# Patient Record
Sex: FEMALE | NOT HISPANIC OR LATINO | URBAN - METROPOLITAN AREA
[De-identification: names, ages, dates, MRNs, and addresses within clinical notes are randomized per-mention and may not be internally consistent; named-entity substitution may affect disease eponyms.]

---

## 2017-03-29 NOTE — PATIENT DISCUSSION
Posterior Capsular Fibrosis Counseling: The diagnosis of posterior capsular fibrosis (PCF), also referred to as a secondary cataract or posterior capsular opacification (PCO), was discussed with the patient. The patient understands that their symptoms and limitations are likely related to this condition. I have reviewed the risks, benefits and alternatives of  YAG laser surgery for the treatment of the fibrosis. The uncommon risk of an increase in intraocular pressure or a retinal detachment and their associated symptoms were explained to the patient. The patient understands and desires to proceed with the laser surgery to improve their vision for __READ SMALL PRINT_________.

## 2017-03-29 NOTE — PATIENT DISCUSSION
New Prescription: FML Liquifilm (fluorometholone): drops,suspension: 0.1% 1 drop twice a day into both eyes 03-

## 2017-03-29 NOTE — PATIENT DISCUSSION
AMD (DRY), OD__:  PRESCRIBE AREDS 2 VITAMINS / AMSLER GRID QD/ UV PROTECTION. SMOKING CESSATION EMPHASIZED. RETURN FOR FOLLOW-UP AS SCHEDULED.

## 2017-03-29 NOTE — PATIENT DISCUSSION
DR. Janessa Germain [facility].  HE IS TO USE 2 TIMES A DAY FOR 2 WEEKS IN THE RIGHT EYE ONLY FOR PAIN

## 2017-03-29 NOTE — PATIENT DISCUSSION
Continue: erythromycin (erythromycin): ointment: 5 mg/gram (0.5 %) a small amount at bedtime into both eyes

## 2017-03-29 NOTE — PATIENT DISCUSSION
BLEPHARITIS, OU: PRESCRIBE WARM COMPRESSES AND EYELID SCRUBS QD-BID, ARTIFICIAL TEARS BID-QID, THE DAILY INTAKE OF OMEGA-3 FATTY ACIDS AND ANTIBIOTIC OINTMENT. RETURN FOR FOLLOW-UP AS SCHEDULED.

## 2017-08-01 ENCOUNTER — IMPORTED ENCOUNTER (OUTPATIENT)
Dept: URBAN - METROPOLITAN AREA CLINIC 1 | Facility: CLINIC | Age: 82
End: 2017-08-01

## 2017-08-01 PROBLEM — H16.143: Noted: 2017-08-01

## 2017-08-01 PROBLEM — H25.813: Noted: 2017-08-01

## 2017-08-01 PROBLEM — H43.813: Noted: 2017-08-01

## 2017-08-01 PROBLEM — H04.123: Noted: 2017-08-01

## 2017-08-01 PROBLEM — H35.033: Noted: 2017-08-01

## 2017-08-01 PROCEDURE — 92004 COMPRE OPH EXAM NEW PT 1/>: CPT

## 2017-08-01 PROCEDURE — 92015 DETERMINE REFRACTIVE STATE: CPT

## 2017-08-01 NOTE — PATIENT DISCUSSION
1.  Cataract OU:  Visually Significant secondary to glare discussed the risks benefits alternatives and limitations of cataract surgery. The patient stated a full understanding and a desire to proceed with the procedure. The patient will need to return for preop appointment with cataract measurements and to have any additional questions answered and start pre-operative eye drops as directed. Phaco PCL OS then OD. Otherwise follow-up 6 months for DFE glare2. PAOLO w/ PEK OU - Increase ATs to TID OU.3.  GR I Hypertensive Retinopathy OU - Stable continue HTN Control. 4. PVD w/o Tear OU - Patient was cautioned to call our office immediately if they experience   a substantial change in their symptoms such as an increase in floaters persistent flashes loss of visual field (may appear as a shadow or a curtain) or decrease in visual acuity as these may indicate a retinal tear or detachment. Return for an appointment in [facility]/ with Dr. Debi Sorto.

## 2017-09-11 ENCOUNTER — IMPORTED ENCOUNTER (OUTPATIENT)
Dept: URBAN - METROPOLITAN AREA CLINIC 1 | Facility: CLINIC | Age: 82
End: 2017-09-11

## 2017-09-11 PROBLEM — H25.813: Noted: 2017-09-11

## 2017-09-11 PROCEDURE — 92136 OPHTHALMIC BIOMETRY: CPT

## 2017-09-11 NOTE — PATIENT DISCUSSION
1. Cataract OU:  Visually Significant secondary to glare discussed the risks benefits alternatives and limitations of cataract surgery. The patient stated a full understanding and a desire to proceed with the procedure. Instructed/ discussed with patient if gtts are expensive (over 150 dollars) to call our office so we can recommend alternatives. Pt understood. Confirmed PO Meds were sent to Pharmacy and Pt understands they will need glasses post-op to achieve their best corrected vision. Phaco PCL OS then OD.

## 2017-09-20 ENCOUNTER — IMPORTED ENCOUNTER (OUTPATIENT)
Dept: URBAN - METROPOLITAN AREA CLINIC 1 | Facility: CLINIC | Age: 82
End: 2017-09-20

## 2017-09-20 NOTE — PATIENT DISCUSSION
Posterior Capsular Fibrosis Counseling: The diagnosis of posterior capsular fibrosis (PCF), also referred to as a secondary cataract or posterior capsular opacification (PCO), was discussed with the patient. The patient understands that their symptoms and limitations are likely related to this condition. I have reviewed the risks, benefits and alternatives of  YAG laser surgery for the treatment of the fibrosis. The uncommon risk of an increase in intraocular pressure or a retinal detachment and their associated symptoms were explained to the patient. The patient understands and desires to proceed with the laser surgery to improve their vision for ____READING_______.

## 2017-09-20 NOTE — PATIENT DISCUSSION
AMD (Dry) Counseling:  I have instructed the patient to take an AREDS 2 vitamin mixture to minimize the risk of disease progression. The importance of daily monitoring with Amsler grid was emphasized and an Amsler grid was provided if the patient did not have one. Return for follow-up as scheduled.

## 2017-09-20 NOTE — PATIENT DISCUSSION
POSTERIOR CAPSULAR FIBROSIS, OU___ - VISUALLY SIGNIFICANT. SCHEDULE YAG CAPSULOTOMY OS___ FIRST THEN LATER YAG CAPSULOTOMY OD____ IF VISUAL SYMPTOMS PERSIST.

## 2017-09-21 ENCOUNTER — IMPORTED ENCOUNTER (OUTPATIENT)
Dept: URBAN - METROPOLITAN AREA CLINIC 1 | Facility: CLINIC | Age: 82
End: 2017-09-21

## 2017-09-21 PROBLEM — Z96.1: Noted: 2017-09-21

## 2017-09-21 PROCEDURE — 99024 POSTOP FOLLOW-UP VISIT: CPT

## 2017-09-25 ENCOUNTER — IMPORTED ENCOUNTER (OUTPATIENT)
Dept: URBAN - METROPOLITAN AREA CLINIC 1 | Facility: CLINIC | Age: 82
End: 2017-09-25

## 2017-09-25 PROCEDURE — 92136 OPHTHALMIC BIOMETRY: CPT

## 2017-09-25 NOTE — PATIENT DISCUSSION
1.  Cataract OD: Visually Significant secondary to glare discussed the risks benefits alternatives and limitations of cataract surgery. The patient stated a full understanding and a desire to proceed with the procedure. Pt understands they will need glasses post-op to achieve their best corrected vision. Phaco PCL OD: Toric lens. **Samples of Durezol and Ilevro given for 2nd eye per PMG** 2.  POW#1  CE/IOL OS (Toric)  doing well.   Use Prednisolone BID OS till out Use Acular Qdaily OS till out F/u as scheduled

## 2017-09-29 PROBLEM — H25.811: Noted: 2017-09-29

## 2017-09-29 PROBLEM — Z96.1: Noted: 2017-09-29

## 2017-10-04 ENCOUNTER — IMPORTED ENCOUNTER (OUTPATIENT)
Dept: URBAN - METROPOLITAN AREA CLINIC 1 | Facility: CLINIC | Age: 82
End: 2017-10-04

## 2017-10-05 ENCOUNTER — IMPORTED ENCOUNTER (OUTPATIENT)
Dept: URBAN - METROPOLITAN AREA CLINIC 1 | Facility: CLINIC | Age: 82
End: 2017-10-05

## 2017-10-05 PROBLEM — Z96.1: Noted: 2017-10-05

## 2017-10-05 PROCEDURE — 99024 POSTOP FOLLOW-UP VISIT: CPT

## 2017-10-05 NOTE — PATIENT DISCUSSION
1. POD#1 CE/IOL Toric OD doing well. Continue all 3 gtts as prescribed and until gone. Ocuflox TIDPrednisolone BIDAcular QDPost op Warnings Reiterated 2. POW#2  CE/IOL Toric OS doing well. Restart Prednisolone BID until gone. Restart Acular QD until gone. 3.   RTC as scheduled

## 2017-10-27 ENCOUNTER — IMPORTED ENCOUNTER (OUTPATIENT)
Dept: URBAN - METROPOLITAN AREA CLINIC 1 | Facility: CLINIC | Age: 82
End: 2017-10-27

## 2017-10-27 PROBLEM — Z96.1: Noted: 2017-10-27

## 2017-10-27 PROCEDURE — 99024 POSTOP FOLLOW-UP VISIT: CPT

## 2017-10-27 NOTE — PATIENT DISCUSSION
1. POM#1 CE/IOL OU (Toric OU) doing well. Use Prednisolone BID OD till out Use Acular Qdaily OD till out ATs TID OU routinely MRX for glasses givenReturn for an appointment in August 30 with Dr. Dae Rhodes.

## 2018-08-02 ENCOUNTER — IMPORTED ENCOUNTER (OUTPATIENT)
Dept: URBAN - METROPOLITAN AREA CLINIC 1 | Facility: CLINIC | Age: 83
End: 2018-08-02

## 2018-08-02 PROBLEM — H16.143: Noted: 2018-08-02

## 2018-08-02 PROBLEM — H04.123: Noted: 2018-08-02

## 2018-08-02 PROBLEM — H43.813: Noted: 2018-08-02

## 2018-08-02 PROBLEM — H35.033: Noted: 2018-08-02

## 2018-08-02 PROBLEM — Z96.1: Noted: 2018-08-02

## 2018-08-02 PROCEDURE — 92014 COMPRE OPH EXAM EST PT 1/>: CPT

## 2018-08-02 PROCEDURE — 92015 DETERMINE REFRACTIVE STATE: CPT

## 2018-08-02 NOTE — PATIENT DISCUSSION
1.  PAOLO w/ increased PEK OU- Progressed OU. D/C Vasacon-A. Ok to use Lumify. The increase of artificial tears OU to QID were recommended routinely. Consider punctal plugs if no improvement. 2.  PVD OU- Old stable. 3.  GR I Hypertensive Retinopathy OU- Stable continue HTN Control4. Pseudophakia OU (Torics OU)- Doing well. 5. Return for an appointment for 10/check K in 6 months with Dr. Davis Pan.

## 2018-12-03 NOTE — PATIENT DISCUSSION
Continue: Maxitrol (neomycin-polymyxin-dexameth): ointment: 3.5-10,000-0.1 mg-unit/g-% a small amount at bedtime into both eyes 05-

## 2019-02-05 ENCOUNTER — IMPORTED ENCOUNTER (OUTPATIENT)
Dept: URBAN - METROPOLITAN AREA CLINIC 1 | Facility: CLINIC | Age: 84
End: 2019-02-05

## 2019-02-05 PROBLEM — H04.123: Noted: 2019-02-05

## 2019-02-05 PROBLEM — H16.143: Noted: 2019-02-05

## 2019-02-05 PROCEDURE — 99213 OFFICE O/P EST LOW 20 MIN: CPT

## 2019-02-05 NOTE — PATIENT DISCUSSION
1.  PAOLO w/ PEK OU- Increase ATs to QID OU Routinely. Consider plugs w/o improvement. 2.  H/o PVD OU 3. H/o GR I Hypertensive Retinopathy OU 4. Pseudophakia OU (Toric OU) Return for an appointment in 6 mo 27 with Dr. Regina Edwards.

## 2019-08-06 ENCOUNTER — IMPORTED ENCOUNTER (OUTPATIENT)
Dept: URBAN - METROPOLITAN AREA CLINIC 1 | Facility: CLINIC | Age: 84
End: 2019-08-06

## 2019-08-06 PROBLEM — H35.033: Noted: 2019-08-06

## 2019-08-06 PROBLEM — H43.813: Noted: 2019-08-06

## 2019-08-06 PROBLEM — H04.123: Noted: 2019-08-06

## 2019-08-06 PROBLEM — H16.143: Noted: 2019-08-06

## 2019-08-06 PROBLEM — Z96.1: Noted: 2019-08-06

## 2019-08-06 PROCEDURE — 92014 COMPRE OPH EXAM EST PT 1/>: CPT

## 2019-08-06 NOTE — PATIENT DISCUSSION
1.  PAOLO w/ PEK OU- Recommend ATs QID OU routinely and Recommend Refresh Celluvisc QHS OU. Consider plugs w/o improvement. Advised to DC Visine Allergy gtts. 2. Pseudophakia OU - (Toric OU) 3. GR I Hypertensive Retinopathy OU- Stable continue HTN Control4. PVD OU - RD precautions. Patient deferred Manifest Rx today. Return for an appointment in 6 months 10 (check ks) with Dr. Mikael Mckay.

## 2020-09-21 ENCOUNTER — IMPORTED ENCOUNTER (OUTPATIENT)
Dept: URBAN - METROPOLITAN AREA CLINIC 1 | Facility: CLINIC | Age: 85
End: 2020-09-21

## 2020-09-21 PROBLEM — H04.123: Noted: 2020-09-21

## 2020-09-21 PROBLEM — H35.033: Noted: 2020-09-21

## 2020-09-21 PROBLEM — H43.813: Noted: 2020-09-21

## 2020-09-21 PROBLEM — H16.143: Noted: 2020-09-21

## 2020-09-21 PROBLEM — Z96.1: Noted: 2020-09-21

## 2020-09-21 PROCEDURE — 92014 COMPRE OPH EXAM EST PT 1/>: CPT

## 2020-09-21 NOTE — PATIENT DISCUSSION
1.  PAOLO w/ PEK OU- Recommend ATs TID OU routinely. Consider plugs w/o improvement. 2.  Pseudophakia OU - (Toric OU) 3. GR I Hypertensive Retinopathy OU- Stable continue HTN Control4. PVD OU - RD precautions. Patient deferred Manifest Rx today. Return for an appointment in 1 year 27 with Dr. Breanne Chauhan.

## 2021-09-01 NOTE — PATIENT DISCUSSION
MILD DRY EYES: PRESCRIBED ARTIFICIAL TEARS BID - QID, OU RETURN FOR FOLLOW-UP AS SCHEDULED OR SOONER IF SYMPTOMS WORSEN. Self

## 2021-09-21 ENCOUNTER — IMPORTED ENCOUNTER (OUTPATIENT)
Dept: URBAN - METROPOLITAN AREA CLINIC 1 | Facility: CLINIC | Age: 86
End: 2021-09-21

## 2021-09-21 PROBLEM — H16.143: Noted: 2021-09-21

## 2021-09-21 PROBLEM — H43.813: Noted: 2021-09-21

## 2021-09-21 PROBLEM — H04.123: Noted: 2021-09-21

## 2021-09-21 PROBLEM — H35.033: Noted: 2021-09-21

## 2021-09-21 PROCEDURE — 99214 OFFICE O/P EST MOD 30 MIN: CPT

## 2021-09-21 PROCEDURE — 92015 DETERMINE REFRACTIVE STATE: CPT

## 2021-09-21 NOTE — PATIENT DISCUSSION
1.  PAOLO w/ PEK OU -- Continue ATs TID OU routinely. 2.  GR I Hypertensive Retinopathy OU -- Stable continue HTN Control3. PVD OU -- RD precautions. 4.  Pseudophakia OU (Toric OU)MRX for glasses given. Return for an appointment in 1 year 27 with Dr. Marjorie Ellsworth.

## 2022-04-02 ASSESSMENT — VISUAL ACUITY
OS_CC: J1
OD_SC: 20/30+1
OS_GLARE: 20/60
OD_CC: 20/20
OS_CC: 20/30
OS_CC: J1
OD_GLARE: 20/60
OD_CC: J1
OS_CC: 20/20
OS_SC: 20/30
OS_CC: 20/20
OD_GLARE: 20/60
OS_CC: 20/40
OS_CC: 20/30
OD_SC: 20/30+1
OS_CC: 20/20
OS_GLARE: 20/60
OD_CC: J1
OS_CC: 20/20
OD_CC: 20/30
OS_CC: 20/20
OD_CC: 20/25+2
OD_CC: 20/20
OD_CC: 20/20
OD_SC: 20/30
OD_CC: 20/25-1
OD_CC: 20/20-2
OS_SC: 20/30
OS_CC: 20/30-1
OD_GLARE: 20/60

## 2022-04-02 ASSESSMENT — TONOMETRY
OS_IOP_MMHG: 14
OS_IOP_MMHG: 15
OS_IOP_MMHG: 14
OD_IOP_MMHG: 14
OS_IOP_MMHG: 17
OD_IOP_MMHG: 15
OD_IOP_MMHG: 14
OS_IOP_MMHG: 15
OS_IOP_MMHG: 14
OD_IOP_MMHG: 17
OD_IOP_MMHG: 15
OD_IOP_MMHG: 14
OD_IOP_MMHG: 16
OS_IOP_MMHG: 14
OS_IOP_MMHG: 14
OS_IOP_MMHG: 13
OS_IOP_MMHG: 14
OD_IOP_MMHG: 17

## 2022-09-22 ENCOUNTER — COMPREHENSIVE EXAM (OUTPATIENT)
Dept: URBAN - METROPOLITAN AREA CLINIC 1 | Facility: CLINIC | Age: 87
End: 2022-09-22

## 2022-09-22 DIAGNOSIS — Z96.1: ICD-10-CM

## 2022-09-22 DIAGNOSIS — H43.813: ICD-10-CM

## 2022-09-22 DIAGNOSIS — H16.143: ICD-10-CM

## 2022-09-22 DIAGNOSIS — H04.123: ICD-10-CM

## 2022-09-22 DIAGNOSIS — H35.033: ICD-10-CM

## 2022-09-22 PROCEDURE — 99214 OFFICE O/P EST MOD 30 MIN: CPT

## 2022-09-22 PROCEDURE — 92015 DETERMINE REFRACTIVE STATE: CPT

## 2022-09-22 ASSESSMENT — TONOMETRY
OD_IOP_MMHG: 14
OS_IOP_MMHG: 14

## 2022-09-22 ASSESSMENT — VISUAL ACUITY
OS_SC: 20/25
OD_SC: 20/25-2

## 2022-11-18 ENCOUNTER — EMERGENCY VISIT (OUTPATIENT)
Dept: URBAN - METROPOLITAN AREA CLINIC 1 | Facility: CLINIC | Age: 87
End: 2022-11-18

## 2022-11-18 DIAGNOSIS — H10.023: ICD-10-CM

## 2022-11-18 DIAGNOSIS — H01.024: ICD-10-CM

## 2022-11-18 DIAGNOSIS — H01.021: ICD-10-CM

## 2022-11-18 PROCEDURE — 92012 INTRM OPH EXAM EST PATIENT: CPT

## 2022-11-18 ASSESSMENT — VISUAL ACUITY
OD_SC: 20/20
OS_SC: 20/20

## 2022-11-18 ASSESSMENT — TONOMETRY
OS_IOP_MMHG: 14
OD_IOP_MMHG: 14

## 2022-11-18 NOTE — PATIENT DISCUSSION
Begin Tobradex (Samples given & Erx'd to [facility]) QID for 1week then Decrease to BID for 1week then D/c. Per RBF patient does not need to f/u. Patient to call if no improvement.

## 2023-09-25 ENCOUNTER — COMPREHENSIVE EXAM (OUTPATIENT)
Dept: URBAN - METROPOLITAN AREA CLINIC 1 | Facility: CLINIC | Age: 88
End: 2023-09-25

## 2023-09-25 DIAGNOSIS — H04.123: ICD-10-CM

## 2023-09-25 DIAGNOSIS — H16.143: ICD-10-CM

## 2023-09-25 DIAGNOSIS — H35.042: ICD-10-CM

## 2023-09-25 PROCEDURE — 99214 OFFICE O/P EST MOD 30 MIN: CPT

## 2023-09-25 ASSESSMENT — VISUAL ACUITY
OD_SC: 20/20
OS_SC: 20/20

## 2023-09-25 ASSESSMENT — TONOMETRY
OD_IOP_MMHG: 14
OS_IOP_MMHG: 14

## 2024-09-12 ENCOUNTER — COMPREHENSIVE EXAM (OUTPATIENT)
Dept: URBAN - METROPOLITAN AREA CLINIC 1 | Facility: CLINIC | Age: 89
End: 2024-09-12

## 2024-09-12 DIAGNOSIS — H35.033: ICD-10-CM

## 2024-09-12 DIAGNOSIS — H43.813: ICD-10-CM

## 2024-09-12 DIAGNOSIS — H35.042: ICD-10-CM

## 2024-09-12 DIAGNOSIS — H01.024: ICD-10-CM

## 2024-09-12 DIAGNOSIS — H10.45: ICD-10-CM

## 2024-09-12 DIAGNOSIS — H01.021: ICD-10-CM

## 2024-09-12 DIAGNOSIS — H16.143: ICD-10-CM

## 2024-09-12 DIAGNOSIS — H04.123: ICD-10-CM

## 2024-09-12 DIAGNOSIS — H02.825: ICD-10-CM

## 2024-09-12 PROCEDURE — 99214 OFFICE O/P EST MOD 30 MIN: CPT

## 2025-08-04 ENCOUNTER — COMPREHENSIVE EXAM (OUTPATIENT)
Age: 89
End: 2025-08-04

## 2025-08-04 DIAGNOSIS — H35.042: ICD-10-CM

## 2025-08-04 DIAGNOSIS — H02.825: ICD-10-CM

## 2025-08-04 DIAGNOSIS — H16.143: ICD-10-CM

## 2025-08-04 DIAGNOSIS — H04.123: ICD-10-CM

## 2025-08-04 DIAGNOSIS — Z96.1: ICD-10-CM

## 2025-08-04 PROCEDURE — 92015 DETERMINE REFRACTIVE STATE: CPT

## 2025-08-04 PROCEDURE — 99214 OFFICE O/P EST MOD 30 MIN: CPT
